# Patient Record
Sex: FEMALE | Race: WHITE | NOT HISPANIC OR LATINO | URBAN - METROPOLITAN AREA
[De-identification: names, ages, dates, MRNs, and addresses within clinical notes are randomized per-mention and may not be internally consistent; named-entity substitution may affect disease eponyms.]

---

## 2019-11-21 ENCOUNTER — APPOINTMENT (RX ONLY)
Dept: URBAN - METROPOLITAN AREA CLINIC 162 | Facility: CLINIC | Age: 39
Setting detail: DERMATOLOGY
End: 2019-11-21

## 2019-11-21 DIAGNOSIS — L29.8 OTHER PRURITUS: ICD-10-CM

## 2019-11-21 DIAGNOSIS — L40.0 PSORIASIS VULGARIS: ICD-10-CM

## 2019-11-21 DIAGNOSIS — L81.4 OTHER MELANIN HYPERPIGMENTATION: ICD-10-CM

## 2019-11-21 DIAGNOSIS — L29.89 OTHER PRURITUS: ICD-10-CM

## 2019-11-21 PROCEDURE — 99203 OFFICE O/P NEW LOW 30 MIN: CPT

## 2019-11-21 PROCEDURE — ? COUNSELING

## 2019-11-21 ASSESSMENT — LOCATION SIMPLE DESCRIPTION DERM
LOCATION SIMPLE: RIGHT UPPER BACK
LOCATION SIMPLE: ABDOMEN

## 2019-11-21 ASSESSMENT — LOCATION DETAILED DESCRIPTION DERM
LOCATION DETAILED: RIGHT MID-UPPER BACK
LOCATION DETAILED: EPIGASTRIC SKIN

## 2019-11-21 ASSESSMENT — LOCATION ZONE DERM: LOCATION ZONE: TRUNK

## 2020-08-18 ENCOUNTER — APPOINTMENT (RX ONLY)
Dept: URBAN - METROPOLITAN AREA CLINIC 162 | Facility: CLINIC | Age: 40
Setting detail: DERMATOLOGY
End: 2020-08-18

## 2020-08-18 DIAGNOSIS — L40.0 PSORIASIS VULGARIS: ICD-10-CM

## 2020-08-18 PROCEDURE — 99213 OFFICE O/P EST LOW 20 MIN: CPT

## 2020-08-18 PROCEDURE — ? COUNSELING

## 2020-08-18 ASSESSMENT — LOCATION DETAILED DESCRIPTION DERM
LOCATION DETAILED: RIGHT VENTRAL DISTAL FOREARM
LOCATION DETAILED: LEFT VENTRAL PROXIMAL FOREARM
LOCATION DETAILED: RIGHT VENTRAL PROXIMAL FOREARM

## 2020-08-18 ASSESSMENT — LOCATION SIMPLE DESCRIPTION DERM
LOCATION SIMPLE: RIGHT FOREARM
LOCATION SIMPLE: LEFT FOREARM

## 2020-08-18 ASSESSMENT — LOCATION ZONE DERM: LOCATION ZONE: ARM

## 2021-05-26 ENCOUNTER — APPOINTMENT (RX ONLY)
Dept: URBAN - METROPOLITAN AREA CLINIC 162 | Facility: CLINIC | Age: 41
Setting detail: DERMATOLOGY
End: 2021-05-26

## 2021-05-26 DIAGNOSIS — L40.0 PSORIASIS VULGARIS: ICD-10-CM | Status: INADEQUATELY CONTROLLED

## 2021-05-26 PROCEDURE — 99213 OFFICE O/P EST LOW 20 MIN: CPT

## 2021-05-26 PROCEDURE — ? ADDITIONAL NOTES

## 2021-05-26 PROCEDURE — ? COUNSELING

## 2021-05-26 ASSESSMENT — LOCATION SIMPLE DESCRIPTION DERM
LOCATION SIMPLE: LEFT PRETIBIAL REGION
LOCATION SIMPLE: RIGHT PRETIBIAL REGION

## 2021-05-26 ASSESSMENT — LOCATION DETAILED DESCRIPTION DERM
LOCATION DETAILED: RIGHT LATERAL PROXIMAL PRETIBIAL REGION
LOCATION DETAILED: LEFT PROXIMAL PRETIBIAL REGION

## 2021-05-26 ASSESSMENT — LOCATION ZONE DERM: LOCATION ZONE: LEG

## 2021-06-14 ENCOUNTER — RX ONLY (OUTPATIENT)
Age: 41
Setting detail: RX ONLY
End: 2021-06-14

## 2021-06-14 RX ORDER — SECUKINUMAB 150 MG/ML
INJECTION SUBCUTANEOUS
Qty: 10 | Refills: 0 | Status: ERX | COMMUNITY
Start: 2021-06-14

## 2021-07-02 ENCOUNTER — APPOINTMENT (RX ONLY)
Dept: URBAN - METROPOLITAN AREA CLINIC 162 | Facility: CLINIC | Age: 41
Setting detail: DERMATOLOGY
End: 2021-07-02

## 2021-07-02 DIAGNOSIS — Z71.89 OTHER SPECIFIED COUNSELING: ICD-10-CM

## 2021-07-02 DIAGNOSIS — L40.0 PSORIASIS VULGARIS: ICD-10-CM | Status: UNCHANGED

## 2021-07-02 PROBLEM — L30.9 DERMATITIS, UNSPECIFIED: Status: ACTIVE | Noted: 2021-07-02

## 2021-07-02 PROCEDURE — 99214 OFFICE O/P EST MOD 30 MIN: CPT

## 2021-07-02 PROCEDURE — ? PRESCRIPTION

## 2021-07-02 PROCEDURE — ? COUNSELING

## 2021-07-02 RX ORDER — CLOBETASOL PROPIONATE 0.5 MG/G
CREAM TOPICAL BID
Qty: 1 | Refills: 5 | Status: ERX | COMMUNITY
Start: 2021-07-02

## 2021-07-02 RX ADMIN — CLOBETASOL PROPIONATE: 0.5 CREAM TOPICAL at 00:00

## 2021-07-02 ASSESSMENT — LOCATION DETAILED DESCRIPTION DERM
LOCATION DETAILED: RIGHT VENTRAL PROXIMAL FOREARM
LOCATION DETAILED: LEFT PROXIMAL PRETIBIAL REGION

## 2021-07-02 ASSESSMENT — LOCATION ZONE DERM
LOCATION ZONE: LEG
LOCATION ZONE: ARM

## 2021-07-02 ASSESSMENT — LOCATION SIMPLE DESCRIPTION DERM
LOCATION SIMPLE: RIGHT FOREARM
LOCATION SIMPLE: LEFT PRETIBIAL REGION

## 2021-07-16 ENCOUNTER — RX ONLY (OUTPATIENT)
Age: 41
Setting detail: RX ONLY
End: 2021-07-16

## 2021-07-16 RX ORDER — SECUKINUMAB 150 MG/ML
INJECTION SUBCUTANEOUS
Qty: 10 | Refills: 0 | COMMUNITY
Start: 2021-07-16

## 2022-09-20 ENCOUNTER — RX ONLY (OUTPATIENT)
Age: 42
Setting detail: RX ONLY
End: 2022-09-20

## 2022-09-20 RX ORDER — SECUKINUMAB 150 MG/ML
INJECTION SUBCUTANEOUS
Qty: 10 | Refills: 0 | Status: ERX

## 2022-10-26 ENCOUNTER — APPOINTMENT (RX ONLY)
Dept: URBAN - METROPOLITAN AREA CLINIC 162 | Facility: CLINIC | Age: 42
Setting detail: DERMATOLOGY
End: 2022-10-26

## 2022-10-26 DIAGNOSIS — L40.0 PSORIASIS VULGARIS: ICD-10-CM | Status: INADEQUATELY CONTROLLED

## 2022-10-26 PROCEDURE — ? PPD RESULT

## 2022-10-26 PROCEDURE — ? COUNSELING

## 2022-10-26 PROCEDURE — ? OTHER

## 2022-10-26 PROCEDURE — ? PRESCRIPTION

## 2022-10-26 PROCEDURE — 99213 OFFICE O/P EST LOW 20 MIN: CPT

## 2022-10-26 RX ORDER — SECUKINUMAB 150 MG/ML
INJECTION SUBCUTANEOUS
Qty: 1 | Refills: 6 | Status: ERX

## 2022-10-26 ASSESSMENT — LOCATION SIMPLE DESCRIPTION DERM: LOCATION SIMPLE: LEFT FOREARM

## 2022-10-26 ASSESSMENT — BSA PSORIASIS: % BODY COVERED IN PSORIASIS: 32

## 2022-10-26 ASSESSMENT — LOCATION DETAILED DESCRIPTION DERM: LOCATION DETAILED: LEFT VENTRAL DISTAL FOREARM

## 2022-10-26 ASSESSMENT — ITCH NUMERIC RATING SCALE: HOW SEVERE IS YOUR ITCHING?: 7

## 2022-10-26 ASSESSMENT — LOCATION ZONE DERM: LOCATION ZONE: ARM

## 2022-12-07 ENCOUNTER — RX ONLY (OUTPATIENT)
Age: 42
Setting detail: RX ONLY
End: 2022-12-07

## 2022-12-07 RX ORDER — SECUKINUMAB 150 MG/ML
INJECTION SUBCUTANEOUS
Qty: 1 | Refills: 6 | Status: ERX

## 2023-03-02 ENCOUNTER — RX ONLY (OUTPATIENT)
Age: 43
Setting detail: RX ONLY
End: 2023-03-02

## 2023-03-02 RX ORDER — SECUKINUMAB 150 MG/ML
INJECTION SUBCUTANEOUS
Qty: 1 | Refills: 6 | Status: ERX

## 2023-03-28 ENCOUNTER — RX ONLY (OUTPATIENT)
Age: 43
Setting detail: RX ONLY
End: 2023-03-28

## 2023-03-28 RX ORDER — SECUKINUMAB 150 MG/ML
INJECTION SUBCUTANEOUS
Qty: 1 | Refills: 6 | Status: ERX

## 2023-03-28 RX ORDER — SECUKINUMAB 150 MG/ML
INJECTION SUBCUTANEOUS
Qty: 1 | Refills: 6 | Status: CANCELLED

## 2023-09-18 ENCOUNTER — RX ONLY (OUTPATIENT)
Age: 43
Setting detail: RX ONLY
End: 2023-09-18

## 2023-09-18 RX ORDER — SECUKINUMAB 150 MG/ML
INJECTION SUBCUTANEOUS
Qty: 1 | Refills: 6 | Status: ERX

## 2023-09-20 ENCOUNTER — RX ONLY (OUTPATIENT)
Age: 43
Setting detail: RX ONLY
End: 2023-09-20

## 2023-09-20 RX ORDER — SECUKINUMAB 150 MG/ML
INJECTION SUBCUTANEOUS
Qty: 1 | Refills: 6 | Status: ERX

## 2023-12-28 ENCOUNTER — RX ONLY (OUTPATIENT)
Age: 43
Setting detail: RX ONLY
End: 2023-12-28

## 2023-12-28 RX ORDER — SECUKINUMAB 150 MG/ML
INJECTION SUBCUTANEOUS
Qty: 1 | Refills: 6 | Status: ERX

## 2024-09-16 ENCOUNTER — APPOINTMENT (RX ONLY)
Dept: URBAN - METROPOLITAN AREA CLINIC 162 | Facility: CLINIC | Age: 44
Setting detail: DERMATOLOGY
End: 2024-09-16

## 2024-09-16 DIAGNOSIS — Z71.89 OTHER SPECIFIED COUNSELING: ICD-10-CM

## 2024-09-16 DIAGNOSIS — L40.0 PSORIASIS VULGARIS: ICD-10-CM | Status: INADEQUATELY CONTROLLED

## 2024-09-16 PROCEDURE — 99213 OFFICE O/P EST LOW 20 MIN: CPT

## 2024-09-16 PROCEDURE — ? PRESCRIPTION

## 2024-09-16 PROCEDURE — ? OTHER

## 2024-09-16 PROCEDURE — ? COUNSELING

## 2024-09-16 RX ORDER — BIMEKIZUMAB 160 MG/ML
INJECTION, SOLUTION SUBCUTANEOUS
Qty: 2 | Refills: 6 | Status: ACTIVE

## 2024-09-16 ASSESSMENT — BSA PSORIASIS: % BODY COVERED IN PSORIASIS: 10

## 2024-09-16 ASSESSMENT — PGA PSORIASIS: PGA PSORIASIS 2020: MODERATE

## 2024-09-16 ASSESSMENT — ITCH NUMERIC RATING SCALE: HOW SEVERE IS YOUR ITCHING?: 10

## 2024-09-16 NOTE — PROCEDURE: OTHER
Other (Free Text): 1 Samples of cosentyx given to patient
Detail Level: Zone
Note Text (......Xxx Chief Complaint.): This diagnosis correlates with the
Render Risk Assessment In Note?: no
Other (Free Text): We will get auth for Bimzelx which should help patients psoriasis. Patient is having issues with getting her Cosentyx.